# Patient Record
Sex: FEMALE | Race: WHITE | URBAN - METROPOLITAN AREA
[De-identification: names, ages, dates, MRNs, and addresses within clinical notes are randomized per-mention and may not be internally consistent; named-entity substitution may affect disease eponyms.]

---

## 2020-03-03 ENCOUNTER — TELEPHONE (OUTPATIENT)
Dept: PHARMACY | Age: 85
End: 2020-03-03

## 2020-03-03 NOTE — TELEPHONE ENCOUNTER
Patient called to schedule an appt with our [facility], [place] clinic. Needs an appt for 3/12 @ 11am for her transportation. Explained that we needed to get a signed order from their current doctor. Confirmed/Updated patient's address/phone & PCP in Epic. Let the patient know we would contact to schedule as soon as the signed order is received from their PCP.

## 2024-04-14 ENCOUNTER — APPOINTMENT (OUTPATIENT)
Dept: CT IMAGING | Age: 89
End: 2024-04-14
Payer: MEDICARE

## 2024-04-14 ENCOUNTER — HOSPITAL ENCOUNTER (OUTPATIENT)
Age: 89
Setting detail: OBSERVATION
Discharge: HOME | End: 2024-04-17
Attending: EMERGENCY MEDICINE | Admitting: STUDENT IN AN ORGANIZED HEALTH CARE EDUCATION/TRAINING PROGRAM
Payer: MEDICARE

## 2024-04-14 DIAGNOSIS — G89.29 CHRONIC BILATERAL LOW BACK PAIN WITHOUT SCIATICA: ICD-10-CM

## 2024-04-14 DIAGNOSIS — K80.20 GALLSTONES: ICD-10-CM

## 2024-04-14 DIAGNOSIS — R10.9 RIGHT SIDED ABDOMINAL PAIN: Primary | ICD-10-CM

## 2024-04-14 DIAGNOSIS — R53.81 PHYSICAL DECONDITIONING: ICD-10-CM

## 2024-04-14 DIAGNOSIS — M15.9 OSTEOARTHRITIS OF MULTIPLE JOINTS, UNSPECIFIED OSTEOARTHRITIS TYPE: ICD-10-CM

## 2024-04-14 DIAGNOSIS — M54.50 CHRONIC BILATERAL LOW BACK PAIN WITHOUT SCIATICA: ICD-10-CM

## 2024-04-14 DIAGNOSIS — M25.551 RIGHT HIP PAIN: ICD-10-CM

## 2024-04-14 DIAGNOSIS — R53.81 DEBILITY: ICD-10-CM

## 2024-04-14 LAB
ALBUMIN SERPL-MCNC: 4.1 G/DL (ref 3.4–5)
ALBUMIN/GLOB SERPL: 1.3 {RATIO} (ref 1.1–2.2)
ALP SERPL-CCNC: 131 U/L (ref 40–129)
ALT SERPL-CCNC: 8 U/L (ref 10–40)
ANION GAP SERPL CALCULATED.3IONS-SCNC: 11 MMOL/L (ref 3–16)
AST SERPL-CCNC: 14 U/L (ref 15–37)
BASOPHILS # BLD: 0.1 K/UL (ref 0–0.2)
BASOPHILS NFR BLD: 0.9 %
BILIRUB SERPL-MCNC: 0.4 MG/DL (ref 0–1)
BUN SERPL-MCNC: 16 MG/DL (ref 7–20)
CALCIUM SERPL-MCNC: 9.5 MG/DL (ref 8.3–10.6)
CHLORIDE SERPL-SCNC: 99 MMOL/L (ref 99–110)
CO2 SERPL-SCNC: 24 MMOL/L (ref 21–32)
CREAT SERPL-MCNC: 0.7 MG/DL (ref 0.6–1.2)
DEPRECATED RDW RBC AUTO: 15.2 % (ref 12.4–15.4)
EOSINOPHIL # BLD: 0.5 K/UL (ref 0–0.6)
EOSINOPHIL NFR BLD: 5.3 %
GFR SERPLBLD CREATININE-BSD FMLA CKD-EPI: 82 ML/MIN/{1.73_M2}
GLUCOSE SERPL-MCNC: 147 MG/DL (ref 70–99)
HCT VFR BLD AUTO: 39.3 % (ref 36–48)
HGB BLD-MCNC: 13.4 G/DL (ref 12–16)
INR PPP: 1.26 (ref 0.85–1.15)
LACTATE BLDV-SCNC: 1 MMOL/L (ref 0.4–1.9)
LIPASE SERPL-CCNC: 32 U/L (ref 13–60)
LYMPHOCYTES # BLD: 1.7 K/UL (ref 1–5.1)
LYMPHOCYTES NFR BLD: 17.7 %
MCH RBC QN AUTO: 29.5 PG (ref 26–34)
MCHC RBC AUTO-ENTMCNC: 34.1 G/DL (ref 31–36)
MCV RBC AUTO: 86.7 FL (ref 80–100)
MONOCYTES # BLD: 0.9 K/UL (ref 0–1.3)
MONOCYTES NFR BLD: 9.4 %
NEUTROPHILS # BLD: 6.5 K/UL (ref 1.7–7.7)
NEUTROPHILS NFR BLD: 66.7 %
PLATELET # BLD AUTO: 307 K/UL (ref 135–450)
PMV BLD AUTO: 7.7 FL (ref 5–10.5)
POTASSIUM SERPL-SCNC: 4.4 MMOL/L (ref 3.5–5.1)
PROT SERPL-MCNC: 7.3 G/DL (ref 6.4–8.2)
PROTHROMBIN TIME: 15.9 SEC (ref 11.9–14.9)
RBC # BLD AUTO: 4.53 M/UL (ref 4–5.2)
SODIUM SERPL-SCNC: 134 MMOL/L (ref 136–145)
WBC # BLD AUTO: 9.7 K/UL (ref 4–11)

## 2024-04-14 PROCEDURE — 74177 CT ABD & PELVIS W/CONTRAST: CPT

## 2024-04-14 PROCEDURE — 6360000004 HC RX CONTRAST MEDICATION: Performed by: NURSE PRACTITIONER

## 2024-04-14 PROCEDURE — 83690 ASSAY OF LIPASE: CPT

## 2024-04-14 PROCEDURE — 85025 COMPLETE CBC W/AUTO DIFF WBC: CPT

## 2024-04-14 PROCEDURE — 73700 CT LOWER EXTREMITY W/O DYE: CPT

## 2024-04-14 PROCEDURE — 99285 EMERGENCY DEPT VISIT HI MDM: CPT

## 2024-04-14 PROCEDURE — 83605 ASSAY OF LACTIC ACID: CPT

## 2024-04-14 PROCEDURE — 85610 PROTHROMBIN TIME: CPT

## 2024-04-14 PROCEDURE — 80053 COMPREHEN METABOLIC PANEL: CPT

## 2024-04-14 RX ORDER — LOSARTAN POTASSIUM 100 MG/1
50 TABLET ORAL DAILY
Status: ON HOLD | COMMUNITY
End: 2024-04-16 | Stop reason: HOSPADM

## 2024-04-14 RX ORDER — FUROSEMIDE 20 MG/1
20 TABLET ORAL DAILY
Status: ON HOLD | COMMUNITY
End: 2024-04-16 | Stop reason: HOSPADM

## 2024-04-14 RX ORDER — NITROGLYCERIN 0.4 MG/1
0.4 TABLET SUBLINGUAL EVERY 5 MIN PRN
COMMUNITY
Start: 2017-08-01 | End: 2024-06-07

## 2024-04-14 RX ORDER — DOXYCYCLINE HYCLATE 100 MG/1
100 CAPSULE ORAL DAILY
Status: ON HOLD | COMMUNITY
End: 2024-04-16 | Stop reason: HOSPADM

## 2024-04-14 RX ORDER — ATORVASTATIN CALCIUM 20 MG/1
1 TABLET, FILM COATED ORAL NIGHTLY
COMMUNITY
Start: 2024-03-22

## 2024-04-14 RX ORDER — AMOXICILLIN 250 MG
2 CAPSULE ORAL 2 TIMES DAILY
COMMUNITY
Start: 2024-04-12 | End: 2024-05-12

## 2024-04-14 RX ORDER — SUCRALFATE 1 G/1
1 TABLET ORAL 4 TIMES DAILY
COMMUNITY
Start: 2024-04-12 | End: 2024-05-12

## 2024-04-14 RX ORDER — OXYBUTYNIN CHLORIDE 10 MG/1
10 TABLET, EXTENDED RELEASE ORAL DAILY
Status: ON HOLD | COMMUNITY
End: 2024-04-16 | Stop reason: HOSPADM

## 2024-04-14 RX ORDER — TIZANIDINE 2 MG/1
2 TABLET ORAL EVERY 6 HOURS PRN
Status: ON HOLD | COMMUNITY
Start: 2024-03-25 | End: 2024-04-16 | Stop reason: HOSPADM

## 2024-04-14 RX ORDER — FAMOTIDINE 20 MG/1
10 TABLET, FILM COATED ORAL 2 TIMES DAILY
COMMUNITY

## 2024-04-14 RX ORDER — PANTOPRAZOLE SODIUM 40 MG/1
40 TABLET, DELAYED RELEASE ORAL DAILY
COMMUNITY
Start: 2024-04-12

## 2024-04-14 RX ORDER — HYDROCODONE BITARTRATE AND ACETAMINOPHEN 5; 325 MG/1; MG/1
1 TABLET ORAL EVERY 6 HOURS PRN
Status: ON HOLD | COMMUNITY
End: 2024-04-16

## 2024-04-14 RX ORDER — METHOCARBAMOL 500 MG/1
500 TABLET, FILM COATED ORAL 3 TIMES DAILY
COMMUNITY
Start: 2024-04-12 | End: 2024-04-19

## 2024-04-14 RX ORDER — ASPIRIN 81 MG/1
81 TABLET ORAL DAILY
COMMUNITY

## 2024-04-14 RX ORDER — WARFARIN SODIUM 2.5 MG/1
2.5 TABLET ORAL DAILY
Status: ON HOLD | COMMUNITY
Start: 2024-04-12 | End: 2024-04-16 | Stop reason: HOSPADM

## 2024-04-14 RX ORDER — CARVEDILOL 12.5 MG/1
1 TABLET ORAL 2 TIMES DAILY
COMMUNITY
Start: 2024-03-22

## 2024-04-14 RX ADMIN — IOPAMIDOL 75 ML: 755 INJECTION, SOLUTION INTRAVENOUS at 23:53

## 2024-04-15 PROBLEM — M54.50 LOWER BACK PAIN: Status: ACTIVE | Noted: 2024-04-15

## 2024-04-15 PROBLEM — R53.1 GENERALIZED WEAKNESS: Status: ACTIVE | Noted: 2024-04-15

## 2024-04-15 LAB
ANION GAP SERPL CALCULATED.3IONS-SCNC: 10 MMOL/L (ref 3–16)
BACTERIA URNS QL MICRO: ABNORMAL /HPF
BILIRUB UR QL STRIP.AUTO: NEGATIVE
BUN SERPL-MCNC: 14 MG/DL (ref 7–20)
CALCIUM SERPL-MCNC: 9 MG/DL (ref 8.3–10.6)
CHLORIDE SERPL-SCNC: 100 MMOL/L (ref 99–110)
CLARITY UR: CLEAR
CO2 SERPL-SCNC: 26 MMOL/L (ref 21–32)
COLOR UR: YELLOW
CREAT SERPL-MCNC: 0.6 MG/DL (ref 0.6–1.2)
EPI CELLS #/AREA URNS AUTO: 3 /HPF (ref 0–5)
GFR SERPLBLD CREATININE-BSD FMLA CKD-EPI: 85 ML/MIN/{1.73_M2}
GLUCOSE SERPL-MCNC: 96 MG/DL (ref 70–99)
GLUCOSE UR STRIP.AUTO-MCNC: NEGATIVE MG/DL
HGB UR QL STRIP.AUTO: NEGATIVE
HYALINE CASTS #/AREA URNS AUTO: 0 /LPF (ref 0–8)
INR PPP: 1.28 (ref 0.85–1.15)
KETONES UR STRIP.AUTO-MCNC: NEGATIVE MG/DL
LEUKOCYTE ESTERASE UR QL STRIP.AUTO: ABNORMAL
NITRITE UR QL STRIP.AUTO: NEGATIVE
PH UR STRIP.AUTO: 6.5 [PH] (ref 5–8)
POTASSIUM SERPL-SCNC: 4.1 MMOL/L (ref 3.5–5.1)
PROT UR STRIP.AUTO-MCNC: NEGATIVE MG/DL
PROTHROMBIN TIME: 16.2 SEC (ref 11.9–14.9)
RBC CLUMPS #/AREA URNS AUTO: 1 /HPF (ref 0–4)
SODIUM SERPL-SCNC: 136 MMOL/L (ref 136–145)
SP GR UR STRIP.AUTO: >=1.03 (ref 1–1.03)
UA COMPLETE W REFLEX CULTURE PNL UR: ABNORMAL
UA DIPSTICK W REFLEX MICRO PNL UR: YES
URN SPEC COLLECT METH UR: ABNORMAL
UROBILINOGEN UR STRIP-ACNC: 0.2 E.U./DL
WBC #/AREA URNS AUTO: 0 /HPF (ref 0–5)

## 2024-04-15 PROCEDURE — 6370000000 HC RX 637 (ALT 250 FOR IP): Performed by: STUDENT IN AN ORGANIZED HEALTH CARE EDUCATION/TRAINING PROGRAM

## 2024-04-15 PROCEDURE — G0378 HOSPITAL OBSERVATION PER HR: HCPCS

## 2024-04-15 PROCEDURE — 81001 URINALYSIS AUTO W/SCOPE: CPT

## 2024-04-15 PROCEDURE — 96372 THER/PROPH/DIAG INJ SC/IM: CPT

## 2024-04-15 PROCEDURE — 80048 BASIC METABOLIC PNL TOTAL CA: CPT

## 2024-04-15 PROCEDURE — 6360000002 HC RX W HCPCS: Performed by: EMERGENCY MEDICINE

## 2024-04-15 PROCEDURE — 6370000000 HC RX 637 (ALT 250 FOR IP): Performed by: NURSE PRACTITIONER

## 2024-04-15 PROCEDURE — 97530 THERAPEUTIC ACTIVITIES: CPT

## 2024-04-15 PROCEDURE — 85610 PROTHROMBIN TIME: CPT

## 2024-04-15 PROCEDURE — 6370000000 HC RX 637 (ALT 250 FOR IP): Performed by: INTERNAL MEDICINE

## 2024-04-15 PROCEDURE — 97165 OT EVAL LOW COMPLEX 30 MIN: CPT

## 2024-04-15 PROCEDURE — 96374 THER/PROPH/DIAG INJ IV PUSH: CPT

## 2024-04-15 PROCEDURE — 96375 TX/PRO/DX INJ NEW DRUG ADDON: CPT

## 2024-04-15 PROCEDURE — 6370000000 HC RX 637 (ALT 250 FOR IP): Performed by: EMERGENCY MEDICINE

## 2024-04-15 PROCEDURE — 6360000002 HC RX W HCPCS: Performed by: NURSE PRACTITIONER

## 2024-04-15 PROCEDURE — 2580000003 HC RX 258: Performed by: STUDENT IN AN ORGANIZED HEALTH CARE EDUCATION/TRAINING PROGRAM

## 2024-04-15 PROCEDURE — 97535 SELF CARE MNGMENT TRAINING: CPT

## 2024-04-15 PROCEDURE — 97116 GAIT TRAINING THERAPY: CPT

## 2024-04-15 PROCEDURE — 96376 TX/PRO/DX INJ SAME DRUG ADON: CPT

## 2024-04-15 PROCEDURE — 97162 PT EVAL MOD COMPLEX 30 MIN: CPT

## 2024-04-15 PROCEDURE — 36415 COLL VENOUS BLD VENIPUNCTURE: CPT

## 2024-04-15 RX ORDER — ACETAMINOPHEN 325 MG/1
650 TABLET ORAL EVERY 6 HOURS PRN
Status: DISCONTINUED | OUTPATIENT
Start: 2024-04-15 | End: 2024-04-17 | Stop reason: HOSPADM

## 2024-04-15 RX ORDER — ONDANSETRON 2 MG/ML
4 INJECTION INTRAMUSCULAR; INTRAVENOUS EVERY 6 HOURS PRN
Status: DISCONTINUED | OUTPATIENT
Start: 2024-04-15 | End: 2024-04-15 | Stop reason: ALTCHOICE

## 2024-04-15 RX ORDER — ATORVASTATIN CALCIUM 20 MG/1
20 TABLET, FILM COATED ORAL NIGHTLY
Status: DISCONTINUED | OUTPATIENT
Start: 2024-04-15 | End: 2024-04-17 | Stop reason: HOSPADM

## 2024-04-15 RX ORDER — LIDOCAINE 4 G/G
2 PATCH TOPICAL DAILY
Status: DISCONTINUED | OUTPATIENT
Start: 2024-04-15 | End: 2024-04-17

## 2024-04-15 RX ORDER — LIDOCAINE 4 G/G
2 PATCH TOPICAL ONCE
Status: COMPLETED | OUTPATIENT
Start: 2024-04-15 | End: 2024-04-15

## 2024-04-15 RX ORDER — POTASSIUM CHLORIDE 7.45 MG/ML
10 INJECTION INTRAVENOUS PRN
Status: DISCONTINUED | OUTPATIENT
Start: 2024-04-15 | End: 2024-04-17 | Stop reason: HOSPADM

## 2024-04-15 RX ORDER — PANTOPRAZOLE SODIUM 40 MG/1
40 TABLET, DELAYED RELEASE ORAL DAILY
Status: DISCONTINUED | OUTPATIENT
Start: 2024-04-15 | End: 2024-04-17 | Stop reason: HOSPADM

## 2024-04-15 RX ORDER — POLYETHYLENE GLYCOL 3350 17 G/17G
17 POWDER, FOR SOLUTION ORAL DAILY PRN
Status: DISCONTINUED | OUTPATIENT
Start: 2024-04-15 | End: 2024-04-16

## 2024-04-15 RX ORDER — SODIUM CHLORIDE 0.9 % (FLUSH) 0.9 %
5-40 SYRINGE (ML) INJECTION EVERY 12 HOURS SCHEDULED
Status: DISCONTINUED | OUTPATIENT
Start: 2024-04-15 | End: 2024-04-17 | Stop reason: HOSPADM

## 2024-04-15 RX ORDER — POTASSIUM CHLORIDE 20 MEQ/1
40 TABLET, EXTENDED RELEASE ORAL PRN
Status: DISCONTINUED | OUTPATIENT
Start: 2024-04-15 | End: 2024-04-17 | Stop reason: HOSPADM

## 2024-04-15 RX ORDER — ENOXAPARIN SODIUM 100 MG/ML
30 INJECTION SUBCUTANEOUS DAILY
Status: DISCONTINUED | OUTPATIENT
Start: 2024-04-15 | End: 2024-04-16

## 2024-04-15 RX ORDER — MAGNESIUM SULFATE IN WATER 40 MG/ML
2000 INJECTION, SOLUTION INTRAVENOUS PRN
Status: DISCONTINUED | OUTPATIENT
Start: 2024-04-15 | End: 2024-04-17 | Stop reason: HOSPADM

## 2024-04-15 RX ORDER — PROCHLORPERAZINE EDISYLATE 5 MG/ML
5 INJECTION INTRAMUSCULAR; INTRAVENOUS EVERY 8 HOURS PRN
Status: DISCONTINUED | OUTPATIENT
Start: 2024-04-15 | End: 2024-04-15 | Stop reason: HOSPADM

## 2024-04-15 RX ORDER — SENNA AND DOCUSATE SODIUM 50; 8.6 MG/1; MG/1
2 TABLET, FILM COATED ORAL 2 TIMES DAILY
Status: DISCONTINUED | OUTPATIENT
Start: 2024-04-15 | End: 2024-04-17 | Stop reason: HOSPADM

## 2024-04-15 RX ORDER — HYDROMORPHONE HYDROCHLORIDE 1 MG/ML
0.5 INJECTION, SOLUTION INTRAMUSCULAR; INTRAVENOUS; SUBCUTANEOUS
Status: COMPLETED | OUTPATIENT
Start: 2024-04-15 | End: 2024-04-15

## 2024-04-15 RX ORDER — ENOXAPARIN SODIUM 100 MG/ML
40 INJECTION SUBCUTANEOUS DAILY
Status: DISCONTINUED | OUTPATIENT
Start: 2024-04-15 | End: 2024-04-15 | Stop reason: ALTCHOICE

## 2024-04-15 RX ORDER — HYDROCODONE BITARTRATE AND ACETAMINOPHEN 5; 325 MG/1; MG/1
1 TABLET ORAL EVERY 6 HOURS PRN
Status: DISCONTINUED | OUTPATIENT
Start: 2024-04-15 | End: 2024-04-17 | Stop reason: HOSPADM

## 2024-04-15 RX ORDER — ONDANSETRON 4 MG/1
4 TABLET, ORALLY DISINTEGRATING ORAL EVERY 8 HOURS PRN
Status: DISCONTINUED | OUTPATIENT
Start: 2024-04-15 | End: 2024-04-17 | Stop reason: HOSPADM

## 2024-04-15 RX ORDER — ACETAMINOPHEN 650 MG/1
650 SUPPOSITORY RECTAL EVERY 6 HOURS PRN
Status: DISCONTINUED | OUTPATIENT
Start: 2024-04-15 | End: 2024-04-17 | Stop reason: HOSPADM

## 2024-04-15 RX ORDER — CEFUROXIME AXETIL 250 MG/1
500 TABLET ORAL EVERY 12 HOURS SCHEDULED
Status: DISCONTINUED | OUTPATIENT
Start: 2024-04-15 | End: 2024-04-17 | Stop reason: HOSPADM

## 2024-04-15 RX ORDER — SODIUM CHLORIDE 9 MG/ML
INJECTION, SOLUTION INTRAVENOUS PRN
Status: DISCONTINUED | OUTPATIENT
Start: 2024-04-15 | End: 2024-04-17 | Stop reason: HOSPADM

## 2024-04-15 RX ORDER — FAMOTIDINE 20 MG/1
10 TABLET, FILM COATED ORAL 2 TIMES DAILY
Status: DISCONTINUED | OUTPATIENT
Start: 2024-04-15 | End: 2024-04-15

## 2024-04-15 RX ORDER — ONDANSETRON 2 MG/ML
4 INJECTION INTRAMUSCULAR; INTRAVENOUS EVERY 6 HOURS PRN
Status: DISCONTINUED | OUTPATIENT
Start: 2024-04-15 | End: 2024-04-17 | Stop reason: HOSPADM

## 2024-04-15 RX ORDER — SODIUM CHLORIDE 0.9 % (FLUSH) 0.9 %
5-40 SYRINGE (ML) INJECTION PRN
Status: DISCONTINUED | OUTPATIENT
Start: 2024-04-15 | End: 2024-04-17 | Stop reason: HOSPADM

## 2024-04-15 RX ORDER — ASPIRIN 81 MG/1
81 TABLET ORAL DAILY
Status: DISCONTINUED | OUTPATIENT
Start: 2024-04-15 | End: 2024-04-17 | Stop reason: HOSPADM

## 2024-04-15 RX ORDER — CARVEDILOL 6.25 MG/1
12.5 TABLET ORAL 2 TIMES DAILY
Status: DISCONTINUED | OUTPATIENT
Start: 2024-04-15 | End: 2024-04-17 | Stop reason: HOSPADM

## 2024-04-15 RX ORDER — OXYBUTYNIN CHLORIDE 10 MG/1
10 TABLET, EXTENDED RELEASE ORAL DAILY
Status: DISCONTINUED | OUTPATIENT
Start: 2024-04-15 | End: 2024-04-15

## 2024-04-15 RX ORDER — LOSARTAN POTASSIUM 100 MG/1
50 TABLET ORAL DAILY
Status: DISCONTINUED | OUTPATIENT
Start: 2024-04-15 | End: 2024-04-15

## 2024-04-15 RX ORDER — MORPHINE SULFATE 2 MG/ML
2 INJECTION, SOLUTION INTRAMUSCULAR; INTRAVENOUS EVERY 4 HOURS PRN
Status: DISCONTINUED | OUTPATIENT
Start: 2024-04-15 | End: 2024-04-17 | Stop reason: HOSPADM

## 2024-04-15 RX ORDER — WARFARIN SODIUM 5 MG/1
5 TABLET ORAL DAILY
Status: COMPLETED | OUTPATIENT
Start: 2024-04-15 | End: 2024-04-16

## 2024-04-15 RX ORDER — CALCIUM CARBONATE 500 MG/1
500 TABLET, CHEWABLE ORAL 3 TIMES DAILY PRN
Status: DISCONTINUED | OUTPATIENT
Start: 2024-04-15 | End: 2024-04-17 | Stop reason: HOSPADM

## 2024-04-15 RX ORDER — HYDROCODONE BITARTRATE AND ACETAMINOPHEN 5; 325 MG/1; MG/1
1 TABLET ORAL ONCE
Status: COMPLETED | OUTPATIENT
Start: 2024-04-15 | End: 2024-04-15

## 2024-04-15 RX ORDER — KETOROLAC TROMETHAMINE 15 MG/ML
15 INJECTION, SOLUTION INTRAMUSCULAR; INTRAVENOUS EVERY 8 HOURS
Status: DISPENSED | OUTPATIENT
Start: 2024-04-15 | End: 2024-04-17

## 2024-04-15 RX ORDER — METHOCARBAMOL 500 MG/1
500 TABLET, FILM COATED ORAL 3 TIMES DAILY
Status: DISCONTINUED | OUTPATIENT
Start: 2024-04-15 | End: 2024-04-17 | Stop reason: HOSPADM

## 2024-04-15 RX ADMIN — ONDANSETRON 4 MG: 2 INJECTION INTRAMUSCULAR; INTRAVENOUS at 01:43

## 2024-04-15 RX ADMIN — METHOCARBAMOL 500 MG: 500 TABLET ORAL at 21:02

## 2024-04-15 RX ADMIN — ONDANSETRON 4 MG: 4 TABLET, ORALLY DISINTEGRATING ORAL at 11:54

## 2024-04-15 RX ADMIN — SENNOSIDES AND DOCUSATE SODIUM 2 TABLET: 8.6; 5 TABLET ORAL at 11:51

## 2024-04-15 RX ADMIN — HYDROMORPHONE HYDROCHLORIDE 0.5 MG: 1 INJECTION, SOLUTION INTRAMUSCULAR; INTRAVENOUS; SUBCUTANEOUS at 01:43

## 2024-04-15 RX ADMIN — ACETAMINOPHEN 650 MG: 325 TABLET ORAL at 06:37

## 2024-04-15 RX ADMIN — CARVEDILOL 12.5 MG: 6.25 TABLET, FILM COATED ORAL at 08:33

## 2024-04-15 RX ADMIN — SENNOSIDES AND DOCUSATE SODIUM 2 TABLET: 8.6; 5 TABLET ORAL at 21:03

## 2024-04-15 RX ADMIN — CEFUROXIME AXETIL 500 MG: 250 TABLET ORAL at 11:51

## 2024-04-15 RX ADMIN — KETOROLAC TROMETHAMINE 15 MG: 15 INJECTION, SOLUTION INTRAMUSCULAR; INTRAVENOUS at 18:26

## 2024-04-15 RX ADMIN — ENOXAPARIN SODIUM 30 MG: 100 INJECTION SUBCUTANEOUS at 16:52

## 2024-04-15 RX ADMIN — PANTOPRAZOLE SODIUM 40 MG: 40 TABLET, DELAYED RELEASE ORAL at 08:33

## 2024-04-15 RX ADMIN — METHOCARBAMOL 500 MG: 500 TABLET ORAL at 13:41

## 2024-04-15 RX ADMIN — KETOROLAC TROMETHAMINE 15 MG: 15 INJECTION, SOLUTION INTRAMUSCULAR; INTRAVENOUS at 11:51

## 2024-04-15 RX ADMIN — ATORVASTATIN CALCIUM 20 MG: 20 TABLET, FILM COATED ORAL at 21:02

## 2024-04-15 RX ADMIN — HYDROCODONE BITARTRATE AND ACETAMINOPHEN 1 TABLET: 5; 325 TABLET ORAL at 09:24

## 2024-04-15 RX ADMIN — SODIUM CHLORIDE, PRESERVATIVE FREE 10 ML: 5 INJECTION INTRAVENOUS at 08:33

## 2024-04-15 RX ADMIN — ASPIRIN 81 MG: 81 TABLET, COATED ORAL at 08:33

## 2024-04-15 RX ADMIN — ANTACID TABLETS 500 MG: 500 TABLET, CHEWABLE ORAL at 16:52

## 2024-04-15 RX ADMIN — HYDROCODONE BITARTRATE AND ACETAMINOPHEN 1 TABLET: 5; 325 TABLET ORAL at 21:02

## 2024-04-15 RX ADMIN — HYDROCODONE BITARTRATE AND ACETAMINOPHEN 1 TABLET: 5; 325 TABLET ORAL at 00:32

## 2024-04-15 RX ADMIN — ONDANSETRON 4 MG: 4 TABLET, ORALLY DISINTEGRATING ORAL at 21:08

## 2024-04-15 RX ADMIN — CARVEDILOL 12.5 MG: 6.25 TABLET, FILM COATED ORAL at 21:03

## 2024-04-15 RX ADMIN — WARFARIN SODIUM 5 MG: 5 TABLET ORAL at 16:52

## 2024-04-15 RX ADMIN — SODIUM CHLORIDE, PRESERVATIVE FREE 10 ML: 5 INJECTION INTRAVENOUS at 21:14

## 2024-04-15 RX ADMIN — CEFUROXIME AXETIL 500 MG: 250 TABLET ORAL at 21:02

## 2024-04-15 ASSESSMENT — PAIN SCALES - GENERAL
PAINLEVEL_OUTOF10: 4
PAINLEVEL_OUTOF10: 6
PAINLEVEL_OUTOF10: 5
PAINLEVEL_OUTOF10: 5
PAINLEVEL_OUTOF10: 9
PAINLEVEL_OUTOF10: 6
PAINLEVEL_OUTOF10: 5

## 2024-04-15 ASSESSMENT — PAIN DESCRIPTION - LOCATION
LOCATION: ABDOMEN;BACK
LOCATION: ABDOMEN
LOCATION: ABDOMEN
LOCATION: BACK
LOCATION: ABDOMEN

## 2024-04-15 ASSESSMENT — PAIN DESCRIPTION - PAIN TYPE: TYPE: ACUTE PAIN;CHRONIC PAIN

## 2024-04-15 ASSESSMENT — PAIN - FUNCTIONAL ASSESSMENT
PAIN_FUNCTIONAL_ASSESSMENT: ACTIVITIES ARE NOT PREVENTED
PAIN_FUNCTIONAL_ASSESSMENT: PREVENTS OR INTERFERES SOME ACTIVE ACTIVITIES AND ADLS
PAIN_FUNCTIONAL_ASSESSMENT: ACTIVITIES ARE NOT PREVENTED

## 2024-04-15 ASSESSMENT — PAIN DESCRIPTION - DESCRIPTORS
DESCRIPTORS: CRAMPING;SPASM

## 2024-04-15 ASSESSMENT — ENCOUNTER SYMPTOMS
SHORTNESS OF BREATH: 0
DIARRHEA: 0
NAUSEA: 0
VOMITING: 0
ABDOMINAL PAIN: 1
CHEST TIGHTNESS: 0

## 2024-04-15 ASSESSMENT — PAIN DESCRIPTION - ORIENTATION: ORIENTATION: RIGHT;LEFT;LOWER

## 2024-04-15 NOTE — ED NOTES
ED TO INPATIENT SBAR HANDOFF    Patient Name: Ivania Ruiz   :  3/3/1933  89 y.o.   MRN:  [id number]  Preferred Name  Ivania  ED Room #:  ED-0010/10  Family/Caregiver Present no   Restraints no   Sitter no   Sepsis Risk Score Sepsis Risk Score: 1.68    Situation  Code Status: No Order No additional code details.    Allergies: Atenolol, Dipyridamole, Felodipine, Iodinated contrast media, Metoprolol, Norfloxacin, Other, Paroxetine hcl, Pentazocine, Sulfa antibiotics, Adenosine, Codeine, Morphine, and Oxycodone-acetaminophen  Weight: Patient Vitals for the past 96 hrs (Last 3 readings):   Weight   04/15/24 0137 66.5 kg (146 lb 8 oz)     Arrived from: home  Chief Complaint:   Chief Complaint   Patient presents with    Abdominal Pain     Pt via daughter from home, c/o abdominal pain, admitted 3 times this year for same, a lot of UTI and pyelonephritis issues, and adrenal mass     Hospital Problem/Diagnosis:  Principal Problem:    Generalized weakness  Resolved Problems:    * No resolved hospital problems. *    Imaging:   CT HIP RIGHT WO CONTRAST   Final Result   1. No evidence of acute fracture or dislocation.   2. Moderate degenerative arthrosis of the right hip joint, right sacroiliac   joint and pubic symphysis.   3. Demineralization of the osseous structures suggesting osteoporosis.         CT ABDOMEN PELVIS W IV CONTRAST Additional Contrast? None   Final Result   1. Cholelithiasis, with gallbladder distension.  No pericholecystic   inflammatory stranding seen to suggest definite cholecystitis.  Could   consider further evaluation with ultrasound if clinically indicated.   2. Small hiatal hernia.   3. Aortic valve calcifications are noted which can be associated with aortic   stenosis.   4. Atherosclerosis including coronary artery involvement.   5. Other nonacute incidental findings as above for which no additional   follow-up necessary.           Abnormal labs:   Abnormal Labs Reviewed   COMPREHENSIVE METABOLIC

## 2024-04-15 NOTE — ED PROVIDER NOTES
EMERGENCY DEPARTMENT SUPERVISING PHYSICIAN NOTE    I have seen this patient & have reviewed history and findings with the PA, NP, or resident physician and provided direct personal supervision as needed. I was present for key portions of any procedures performed. I've participated in medical management, monitoring, and treatment of this patient with the provider. I have reviewed currently available documentation, test results, and laboratory results in the interim. Care plan has been developed collaboratively.    Ivania Ruiz is a 89 y.o. year old female presenting to the emergency department for Abdominal Pain (Pt via daughter from home, c/o abdominal pain, admitted 3 times this year for same, a lot of UTI and pyelonephritis issues, and adrenal mass)    Brief HPI:  89yrs F reports that she has been suffering with persistent pain throughout her right abdomen, right hip, and right lower back at least for the past month.  Has had multiple recent hospitalizations at [facility].    Has had incredibly thorough recent evaluation with:  - Lab testing  - CT imaging of her chest, abdomen, pelvis, lumbar spine  - Nuclear medicine whole-body bone scan  - Abdominal ultrasound  - MRI abdomen and lumbar spine with and without contrast  - Surgical and gastroenterology consultations    Was found to have gallstones and numerous areas of bony degenerative changes  Was told that she would not be appropriate for surgical interventions for these issues given her advanced age and general poor state of health    Since returning home she has been suffering with what she describes as very severe pain  She has been unable to walk independently and unable to perform her ADLs well    Pertinent Exam Findings:  Awake, alert, very chronically but not acutely ill-appearing  Resting in exam bed with no overt, external signs of distress  Abdomen soft, NT, ND, +BS    Plan:  She and her family members at bedside are requesting 
Support Exception - unselect if not a suspected or confirmed emergency medical condition->Emergency Medical Condition (MA) FINDINGS: Lower Chest: No distal esophageal thickening.  Small hiatal hernia.  Aortic and coronary artery atherosclerosis.  Aortic valve calcifications are seen which can be associated with aortic stenosis.  No parenchymal consolidation. No pleural effusion is identified. Organs: No hypodense mass identified in the liver or spleen.  Calcified splenic granulomas are seen throughout.  Cholelithiasis.  No pericholecystic inflammatory stranding is identified.  No adrenal mass.  No pancreatic mass or ductal dilation.  No solid enhancing renal mass.  No renal calculi or obstruction.  No ureteral calculi are seen. GI/Bowel: Scattered stool identified throughout the colon.  No ileus or obstruction.  No focal inflammatory bowel wall thickening.  No acute diverticulitis.  No CT finding to suggest appendicitis. Pelvis: No pelvic mass.  No pelvic hemorrhage.  No bladder wall thickening. Uterus appears unremarkable. Peritoneum/Retroperitoneum: No aortic aneurysm.  Aorto iliac and mesenteric artery atherosclerosis.  No bowel herniation is identified. Bones/Soft Tissues: Chronic T12, L3 and L5 compression deformities with cement from prior augmentation.  Subacute to chronic appearing compression fracture at L1.  Chronic appearing compression deformity L2 and minimal compression deformity at L4, chronic in appearance.  No acute osseous abnormality is identified.  Diffuse osteopenia.  No pelvic fracture. Degenerative changes seen throughout the lower thoracic and lumbar spine. Exaggerated kyphosis at T12-L1.     1. Cholelithiasis, with gallbladder distension.  No pericholecystic inflammatory stranding seen to suggest definite cholecystitis.  Could consider further evaluation with ultrasound if clinically indicated. 2. Small hiatal hernia. 3. Aortic valve calcifications are noted which can be associated with

## 2024-04-15 NOTE — ACP (ADVANCE CARE PLANNING)
Advance Care Planning     Palliative Team Advance Care Planning (ACP) Conversation    Date of Conversation: 04/15/24    Key individuals present for the conversation:  Patient with decision making capacity and Legal next of kin    Other individuals present: Patient and Daughter Felipa     ACP documents on file prior to discussion:    [] Advance Directive  [] Portable DNR  [] POLST  [] [place] MOST  [x] None    Healthcare Decision Maker: Daughter Felipa when indicated. [phone]       Conversation Summary: Patient and daughter agree with full code after education provided.     Resuscitation Status:   Code Status: Full Code     Completed Documentation:    [] Advance Directive  [] Portable DNR  [] POLST  [] [place] MOST  [x] No new documents completed    I spent 20 minutes with the patient and/or surrogate decision maker discussing the patient's wishes and goals as detailed in the above note.     Gladys Clancy RN

## 2024-04-15 NOTE — CARE COORDINATION
Case Management Note:    Patient admitted as Observation with an anticipated short hospitalization length of stay. Chart reviewed and it appears that patient will most likely need SNF, there is a pending palliative consult as well. Pt has pending PT/OT orders.       Case management will continue to follow progress and update discharge plan as needed.     Electronically signed by Sofia Washington on 4/15/2024 at 8:34 AM

## 2024-04-15 NOTE — H&P
V2.0  History and Physical      Name:  Ivania Ruiz /Age/Sex: 3/3/1933  (89 y.o. female)   MRN & CSN:  [id number] & [id number] Encounter Date/Time: 4/15/2024 1:47 AM EDT   Location:  [facility] PCP: Carolann Sheets MD       Hospital Day: 2    Assessment and Plan:   Ivania Ruiz is a 89 y.o. female with a pmh of A-fib, COPD, CVA, hypertension, hyperlipidemia who presents with chief complaint of lower back pain.    Hospital Problems             Last Modified POA    * (Principal) Generalized weakness 4/15/2024 Yes    Lower back pain 4/15/2024 Yes       Plan:  # Lower back pain:  -Patient presented with chief complaint of lower back pain.  Reports that the back pain radiates to lower abdomen.  The pain gets worse with ambulation  -CT scan of abdomen and pelvis was done and showed Chronic T12, L3 and L5 compression deformities with  cement from prior augmentation.  Subacute to chronic appearing compression fracture at L1.  Chronic appearing compression deformity L2 and minimal compression deformity at L4. Degenerative changes seen throughout the lower thoracic and lumbar spine.  -Patient had MRI of lumbar spine 2024 and showed Acute or subacute compression deformity of the L1 vertebral body. Multilevel degenerative changes of the lumbar spine which are most significant at L2-3.   -PT OT  -Pain control  -Palliative care consult    # Cholelithiasis?  -CT scan of abdomen and pelvis showed Cholelithiasis, with gallbladder distension.  No pericholecystic inflammatory stranding seen to suggest definite cholecystitis.  -Jain sign negative.  -Bilirubin within normal limit    # History of A-fib:  -Continue home carvedilol 12.5 mg twice daily  -Continue home warfarin    # Hyperlipidemia:  -Continue home Lipitor 20 mg    # History of CVA:  -Continue home aspirin and atorvastatin 20 mg    # GERD:  -Continue home Protonix    # CAD:  -Continue home aspirin, Coreg, atorvastatin      Disposition:   Current Living

## 2024-04-15 NOTE — CONSULTS
Pharmacy to Dose Warfarin    Pharmacy consulted to dose warfarin for Afib.    INR Goal: 2-3    INR today: 1.28    Home dose: 2.5 mg daily except 5 mg Tu/Fri  Of note, patient follows with her cardiologist as outpatient. Recent subtherapeutic INRs and has not been bridged.    Assessment/Plan:  - INR subtherapeutic  - warfarin 5 mg tonight  - Possible concomitant drug-drug interactions include: aspirin     Pharmacy will continue to follow.    Sheryl Pichardo, PharmD, BCPS  [id number]  4/15/2024 8:02 AM

## 2024-04-15 NOTE — CARE COORDINATION
Discharge Planning Note Re: Home Health Care     CM/SW noted consult for discharge planning. Chart reviewed. Noted recommendations for home health care. CM placed a call to Pt's daughter about HHC recommendations for 24 hour support.     CM called Felipa Dtr. [phone]    Electronically signed by Sofia Washington on 4/15/2024 at 2:32 PM     Late Entry for 4/15 - CM to bedside to discuss HHC with Pt. Dtr. On telephone. CM educated PT and Dtr. That hospice usually does not cover PT/OT, but some hospice companies will assist with a few sessions. Pt. And Dtr. Want to continue with plan of for hospice, and not HHC.     Electronically signed by Sofia Washington on 4/16/2024 at 8:24 AM

## 2024-04-15 NOTE — CARE COORDINATION
Case Management Assessment  Initial Evaluation    Date/Time of Evaluation: 4/15/2024 9:12 AM  Assessment Completed by: Sofia Washington    If patient is discharged prior to next notation, then this note serves as note for discharge by case management.    Patient Name: Ivania Ruiz                   YOB: 1933  Diagnosis: Debility [R53.81]  Lower back pain [M54.50]  Gallstones [K80.20]  Generalized weakness [R53.1]  Physical deconditioning [R53.81]  Right hip pain [M25.551]  Right sided abdominal pain [R10.9]  Osteoarthritis of multiple joints, unspecified osteoarthritis type [M15.9]                   Date / Time: 4/14/2024  9:50 PM    Patient Admission Status: Observation   Readmission Risk (Low < 19, Mod (19-27), High > 27): No data recorded  Current PCP: Carolann Sheets MD  PCP verified by CM? (P) Yes    Chart Reviewed: Yes      History Provided by: (P) Patient  Patient Orientation: (P) Alert and Oriented, Person, Place    Patient Cognition: (P) Alert    Hospitalization in the last 30 days (Readmission):  No    If yes, Readmission Assessment in  Navigator will be completed.    Advance Directives:      Code Status: Full Code   Patient's Primary Decision Maker is: (P) Legal Next of Kin      Discharge Planning:    Patient lives with:   Type of Home: (P) Other (Comment) (PT/OT Pending)  Primary Care Giver: (P) Family  Patient Support Systems include: (P) Children   Current Financial resources: (P) Medicare  Current community resources: (P) None  Current services prior to admission: (P) Durable Medical Equipment            Current DME: (P) Walker, Other (Comment), Shower Chair (walker, rollator, toielt seat riser)            Type of Home Care services:  (P) None    ADLS  Prior functional level: (P) Assistance with the following:, Housework, Shopping, Mobility (dimple [employer] helps with grocery shopping. Alexis gets prescriptions . [facility] helps)  Current functional level: (P) Assistance with the

## 2024-04-15 NOTE — CONSULTS
Palliative Care:    a 89 y.o. female with pmh of A-fib, COPD, CVA, hypertension, hyperlipidemia who presents with lower back pain.     Patient mentioned that her lower back pain has been going on since February.  Reports that her lower back pain radiates to her lower abdomen.  Patient also endorses right hip pain.  Denies having chest pain, shortness of breath, nausea, vomiting.  Reports that her lower back pain and lower abdominal pain gets worse with ambulation.  Denies having constipation, diarrhea, lower extremity edema.  Patient denies having urinary urgency/frequency, dysuria.  Patient has been hospitalized 3 times since February for lower back pain and lower abdominal pain @ [facility]. Patient is currently interested in palliative care consult.  Patient admitted 4/15/24 and Palliative consult placed.     Past Medical History:   has no past medical history on file.    Past Surgical History:   has no past surgical history on file.    Advance Directives:        Advance Care Planning     Palliative Team Advance Care Planning (ACP) Conversation    Date of Conversation: 04/15/24    Key individuals present for the conversation:  Patient with decision making capacity and Legal next of kin    Other individuals present: Legal healthcare decision maker as named below and Patient     ACP documents on file prior to discussion:    [] Advance Directive  [] Portable DNR  [] POLST  [] [place] MOST  [x] None    Healthcare Decision Maker: Felipa Mitchell daughter: [phone]       Conversation Summary: Patient agrees with Full Code status. Daughter Felipa on phone during these conversations.     Resuscitation Status: Full Code    Completed Documentation:    [] Advance Directive  [] Portable DNR  [] POLST  [] [place] MOST  [x] No new documents completed    I spent 20 minutes with the patient and/or surrogate decision maker discussing the patient's wishes and goals as detailed in the above note.     Gladys Clancy,

## 2024-04-16 ENCOUNTER — APPOINTMENT (OUTPATIENT)
Dept: GENERAL RADIOLOGY | Age: 89
End: 2024-04-16
Payer: MEDICARE

## 2024-04-16 LAB
ANION GAP SERPL CALCULATED.3IONS-SCNC: 9 MMOL/L (ref 3–16)
BASOPHILS # BLD: 0.1 K/UL (ref 0–0.2)
BASOPHILS NFR BLD: 0.8 %
BUN SERPL-MCNC: 16 MG/DL (ref 7–20)
CALCIUM SERPL-MCNC: 8.9 MG/DL (ref 8.3–10.6)
CHLORIDE SERPL-SCNC: 104 MMOL/L (ref 99–110)
CO2 SERPL-SCNC: 25 MMOL/L (ref 21–32)
CREAT SERPL-MCNC: 1 MG/DL (ref 0.6–1.2)
DEPRECATED RDW RBC AUTO: 15.4 % (ref 12.4–15.4)
EOSINOPHIL # BLD: 0.7 K/UL (ref 0–0.6)
EOSINOPHIL NFR BLD: 8 %
GFR SERPLBLD CREATININE-BSD FMLA CKD-EPI: 53 ML/MIN/{1.73_M2}
GLUCOSE SERPL-MCNC: 97 MG/DL (ref 70–99)
HCT VFR BLD AUTO: 34.9 % (ref 36–48)
HGB BLD-MCNC: 11.8 G/DL (ref 12–16)
INR PPP: 1.59 (ref 0.85–1.15)
LYMPHOCYTES # BLD: 1.6 K/UL (ref 1–5.1)
LYMPHOCYTES NFR BLD: 18.9 %
MCH RBC QN AUTO: 29.5 PG (ref 26–34)
MCHC RBC AUTO-ENTMCNC: 33.7 G/DL (ref 31–36)
MCV RBC AUTO: 87.5 FL (ref 80–100)
MONOCYTES # BLD: 0.9 K/UL (ref 0–1.3)
MONOCYTES NFR BLD: 11.2 %
NEUTROPHILS # BLD: 5 K/UL (ref 1.7–7.7)
NEUTROPHILS NFR BLD: 61.1 %
PLATELET # BLD AUTO: 219 K/UL (ref 135–450)
PMV BLD AUTO: 7.7 FL (ref 5–10.5)
POTASSIUM SERPL-SCNC: 4.9 MMOL/L (ref 3.5–5.1)
PROTHROMBIN TIME: 19.1 SEC (ref 11.9–14.9)
RBC # BLD AUTO: 3.99 M/UL (ref 4–5.2)
SODIUM SERPL-SCNC: 138 MMOL/L (ref 136–145)
WBC # BLD AUTO: 8.2 K/UL (ref 4–11)

## 2024-04-16 PROCEDURE — 85025 COMPLETE CBC W/AUTO DIFF WBC: CPT

## 2024-04-16 PROCEDURE — G0378 HOSPITAL OBSERVATION PER HR: HCPCS

## 2024-04-16 PROCEDURE — 74018 RADEX ABDOMEN 1 VIEW: CPT

## 2024-04-16 PROCEDURE — 6370000000 HC RX 637 (ALT 250 FOR IP): Performed by: INTERNAL MEDICINE

## 2024-04-16 PROCEDURE — 96376 TX/PRO/DX INJ SAME DRUG ADON: CPT

## 2024-04-16 PROCEDURE — 6370000000 HC RX 637 (ALT 250 FOR IP): Performed by: STUDENT IN AN ORGANIZED HEALTH CARE EDUCATION/TRAINING PROGRAM

## 2024-04-16 PROCEDURE — 96372 THER/PROPH/DIAG INJ SC/IM: CPT

## 2024-04-16 PROCEDURE — 6360000002 HC RX W HCPCS: Performed by: NURSE PRACTITIONER

## 2024-04-16 PROCEDURE — 85610 PROTHROMBIN TIME: CPT

## 2024-04-16 PROCEDURE — 80048 BASIC METABOLIC PNL TOTAL CA: CPT

## 2024-04-16 PROCEDURE — 36415 COLL VENOUS BLD VENIPUNCTURE: CPT

## 2024-04-16 PROCEDURE — 2580000003 HC RX 258: Performed by: STUDENT IN AN ORGANIZED HEALTH CARE EDUCATION/TRAINING PROGRAM

## 2024-04-16 PROCEDURE — 6370000000 HC RX 637 (ALT 250 FOR IP): Performed by: NURSE PRACTITIONER

## 2024-04-16 RX ORDER — CEFUROXIME AXETIL 500 MG/1
500 TABLET ORAL EVERY 12 HOURS SCHEDULED
Qty: 3 TABLET | Refills: 0 | COMMUNITY
Start: 2024-04-16

## 2024-04-16 RX ORDER — AMLODIPINE BESYLATE 5 MG/1
5 TABLET ORAL DAILY
COMMUNITY
Start: 2024-04-16

## 2024-04-16 RX ORDER — WARFARIN SODIUM 5 MG/1
TABLET ORAL
Qty: 30 TABLET | Refills: 3 | COMMUNITY
Start: 2024-04-16

## 2024-04-16 RX ORDER — POLYETHYLENE GLYCOL 3350 17 G/17G
17 POWDER, FOR SOLUTION ORAL DAILY
Qty: 527 G | Refills: 1 | Status: SHIPPED | OUTPATIENT
Start: 2024-04-16

## 2024-04-16 RX ORDER — BISACODYL 10 MG
10 SUPPOSITORY, RECTAL RECTAL ONCE
Status: COMPLETED | OUTPATIENT
Start: 2024-04-16 | End: 2024-04-16

## 2024-04-16 RX ORDER — POLYETHYLENE GLYCOL 3350 17 G/17G
17 POWDER, FOR SOLUTION ORAL DAILY
Status: DISCONTINUED | OUTPATIENT
Start: 2024-04-16 | End: 2024-04-17 | Stop reason: HOSPADM

## 2024-04-16 RX ORDER — HYDROCODONE BITARTRATE AND ACETAMINOPHEN 5; 325 MG/1; MG/1
1 TABLET ORAL EVERY 6 HOURS PRN
Qty: 12 TABLET | Refills: 0 | Status: SHIPPED | OUTPATIENT
Start: 2024-04-16 | End: 2024-04-19

## 2024-04-16 RX ORDER — BISACODYL 5 MG/1
5 TABLET, DELAYED RELEASE ORAL ONCE
Status: COMPLETED | OUTPATIENT
Start: 2024-04-16 | End: 2024-04-16

## 2024-04-16 RX ADMIN — CARVEDILOL 12.5 MG: 6.25 TABLET, FILM COATED ORAL at 09:28

## 2024-04-16 RX ADMIN — ASPIRIN 81 MG: 81 TABLET, COATED ORAL at 09:25

## 2024-04-16 RX ADMIN — CEFUROXIME AXETIL 500 MG: 250 TABLET ORAL at 21:16

## 2024-04-16 RX ADMIN — POLYETHYLENE GLYCOL 3350 17 G: 17 POWDER, FOR SOLUTION ORAL at 17:43

## 2024-04-16 RX ADMIN — METHOCARBAMOL 500 MG: 500 TABLET ORAL at 21:17

## 2024-04-16 RX ADMIN — Medication 240 ML: at 14:27

## 2024-04-16 RX ADMIN — BISACODYL 5 MG: 5 TABLET, COATED ORAL at 10:23

## 2024-04-16 RX ADMIN — CEFUROXIME AXETIL 500 MG: 250 TABLET ORAL at 09:28

## 2024-04-16 RX ADMIN — BISACODYL 10 MG: 10 SUPPOSITORY RECTAL at 10:22

## 2024-04-16 RX ADMIN — HYDROCODONE BITARTRATE AND ACETAMINOPHEN 1 TABLET: 5; 325 TABLET ORAL at 13:42

## 2024-04-16 RX ADMIN — KETOROLAC TROMETHAMINE 15 MG: 15 INJECTION, SOLUTION INTRAMUSCULAR; INTRAVENOUS at 02:52

## 2024-04-16 RX ADMIN — SODIUM CHLORIDE, PRESERVATIVE FREE 10 ML: 5 INJECTION INTRAVENOUS at 09:40

## 2024-04-16 RX ADMIN — ATORVASTATIN CALCIUM 20 MG: 20 TABLET, FILM COATED ORAL at 21:17

## 2024-04-16 RX ADMIN — WARFARIN SODIUM 5 MG: 5 TABLET ORAL at 17:34

## 2024-04-16 RX ADMIN — CARVEDILOL 12.5 MG: 6.25 TABLET, FILM COATED ORAL at 21:17

## 2024-04-16 RX ADMIN — ENOXAPARIN SODIUM 30 MG: 100 INJECTION SUBCUTANEOUS at 09:29

## 2024-04-16 RX ADMIN — SODIUM CHLORIDE, PRESERVATIVE FREE 10 ML: 5 INJECTION INTRAVENOUS at 21:17

## 2024-04-16 RX ADMIN — PANTOPRAZOLE SODIUM 40 MG: 40 TABLET, DELAYED RELEASE ORAL at 10:21

## 2024-04-16 RX ADMIN — SENNOSIDES AND DOCUSATE SODIUM 2 TABLET: 8.6; 5 TABLET ORAL at 21:17

## 2024-04-16 RX ADMIN — KETOROLAC TROMETHAMINE 15 MG: 15 INJECTION, SOLUTION INTRAMUSCULAR; INTRAVENOUS at 13:22

## 2024-04-16 RX ADMIN — METHOCARBAMOL 500 MG: 500 TABLET ORAL at 09:27

## 2024-04-16 RX ADMIN — ANTACID TABLETS 500 MG: 500 TABLET, CHEWABLE ORAL at 09:25

## 2024-04-16 RX ADMIN — HYDROCODONE BITARTRATE AND ACETAMINOPHEN 1 TABLET: 5; 325 TABLET ORAL at 20:48

## 2024-04-16 RX ADMIN — SENNOSIDES AND DOCUSATE SODIUM 2 TABLET: 8.6; 5 TABLET ORAL at 09:38

## 2024-04-16 ASSESSMENT — PAIN SCALES - GENERAL
PAINLEVEL_OUTOF10: 8
PAINLEVEL_OUTOF10: 7
PAINLEVEL_OUTOF10: 4
PAINLEVEL_OUTOF10: 5
PAINLEVEL_OUTOF10: 7
PAINLEVEL_OUTOF10: 3
PAINLEVEL_OUTOF10: 5
PAINLEVEL_OUTOF10: 2
PAINLEVEL_OUTOF10: 2
PAINLEVEL_OUTOF10: 0

## 2024-04-16 ASSESSMENT — PAIN SCALES - WONG BAKER
WONGBAKER_NUMERICALRESPONSE: HURTS A LITTLE BIT
WONGBAKER_NUMERICALRESPONSE: HURTS A LITTLE BIT

## 2024-04-16 ASSESSMENT — PAIN DESCRIPTION - ORIENTATION
ORIENTATION: LOWER;RIGHT
ORIENTATION: LOWER;RIGHT

## 2024-04-16 ASSESSMENT — PAIN DESCRIPTION - LOCATION
LOCATION: ABDOMEN
LOCATION: ABDOMEN;BACK
LOCATION: ABDOMEN
LOCATION: ABDOMEN

## 2024-04-16 ASSESSMENT — PAIN DESCRIPTION - DESCRIPTORS
DESCRIPTORS: ACHING

## 2024-04-16 ASSESSMENT — PAIN - FUNCTIONAL ASSESSMENT
PAIN_FUNCTIONAL_ASSESSMENT: PREVENTS OR INTERFERES SOME ACTIVE ACTIVITIES AND ADLS
PAIN_FUNCTIONAL_ASSESSMENT: PREVENTS OR INTERFERES SOME ACTIVE ACTIVITIES AND ADLS

## 2024-04-16 NOTE — DISCHARGE INSTR - COC
Continuity of Care Form    Patient Name: Ivania Ruiz   :  3/3/1933  MRN:  [id number]    Admit date:  2024  Discharge date:  ***    Code Status Order: Full Code   Advance Directives:     Admitting Physician:  Nya De La Vega MD  PCP: Carolann Sheets MD    Discharging Nurse: ***  Discharging Hospital Unit/Room#: 3AN-3315/3315-01  Discharging Unit Phone Number: ***    Emergency Contact:   Extended Emergency Contact Information  Primary Emergency Contact: Felipa Mitchell  Mobile Phone: [phone]  Relation: Child    Past Surgical History:  No past surgical history on file.    Immunization History:     There is no immunization history on file for this patient.    Active Problems:  Patient Active Problem List   Diagnosis Code    Generalized weakness R53.1    Lower back pain M54.50       Isolation/Infection:   Isolation            No Isolation          Patient Infection Status       None to display            Nurse Assessment:  Last Vital Signs: /67   Pulse 67   Temp 98.2 °F (36.8 °C) (Oral)   Resp 16   Ht 1.52 m (4' 11.84\")   Wt 68.7 kg (151 lb 6.4 oz)   SpO2 95%   BMI 29.72 kg/m²     Last documented pain score (0-10 scale): Pain Level: 5  Last Weight:   Wt Readings from Last 1 Encounters:   04/15/24 68.7 kg (151 lb 6.4 oz)     Mental Status:  {IP PT MENTAL STATUS:64267}    IV Access:  { TING IV ACCESS:808798503}    Nursing Mobility/ADLs:  Walking   {CHP DME ADLs:751698932}  Transfer  {CHP DME ADLs:949105030}  Bathing  {CHP DME ADLs:690805075}  Dressing  {CHP DME ADLs:245721980}  Toileting  {CHP DME ADLs:829881457}  Feeding  {CHP DME ADLs:005296992}  Med Admin  {CHP DME ADLs:683018218}  Med Delivery   { TING MED Delivery:370471644}    Wound Care Documentation and Therapy:        Elimination:  Continence:   Bowel: {YES / NO:}  Bladder: {YES / NO:}  Urinary Catheter: {Urinary Catheter:761594099}   Colostomy/Ileostomy/Ileal Conduit: {YES / NO:}       Date of Last BM: ***  No

## 2024-04-16 NOTE — CARE COORDINATION
CM reached out to RN for update, awaiting BM. RN reports family will be here at 8 pm tonight if pt is ready for discharge.   RN reports she updated [facility] at [facility].     Electronically signed by Sofia Washington on 4/16/2024 at 4:21 PM

## 2024-04-16 NOTE — PLAN OF CARE
Problem: Discharge Planning  Goal: Discharge to home or other facility with appropriate resources  4/16/2024 1045 by Mamie Malone  Outcome: Progressing     Problem: Skin/Tissue Integrity  Goal: Absence of new skin breakdown  Description: 1.  Monitor for areas of redness and/or skin breakdown  2.  Assess vascular access sites hourly  3.  Every 4-6 hours minimum:  Change oxygen saturation probe site  4.  Every 4-6 hours:  If on nasal continuous positive airway pressure, respiratory therapy assess nares and determine need for appliance change or resting period.  4/16/2024 1045 by Mamie Malone  Outcome: Progressing     Problem: ABCDS Injury Assessment  Goal: Absence of physical injury  4/16/2024 1045 by Mamie Malone  Outcome: Progressing     Problem: Safety - Adult  Goal: Free from fall injury  4/16/2024 1045 by Mamie Malone  Outcome: Progressing     Problem: Pain  Goal: Verbalizes/displays adequate comfort level or baseline comfort level  4/16/2024 1045 by Mamie Malone  Outcome: Progressing

## 2024-04-17 VITALS
HEART RATE: 75 BPM | OXYGEN SATURATION: 95 % | TEMPERATURE: 97.8 F | BODY MASS INDEX: 28.91 KG/M2 | DIASTOLIC BLOOD PRESSURE: 77 MMHG | RESPIRATION RATE: 16 BRPM | WEIGHT: 147.27 LBS | SYSTOLIC BLOOD PRESSURE: 155 MMHG | HEIGHT: 60 IN

## 2024-04-17 LAB
ANION GAP SERPL CALCULATED.3IONS-SCNC: 10 MMOL/L (ref 3–16)
BASOPHILS # BLD: 0.1 K/UL (ref 0–0.2)
BASOPHILS NFR BLD: 0.6 %
BUN SERPL-MCNC: 11 MG/DL (ref 7–20)
CALCIUM SERPL-MCNC: 8.7 MG/DL (ref 8.3–10.6)
CHLORIDE SERPL-SCNC: 104 MMOL/L (ref 99–110)
CO2 SERPL-SCNC: 24 MMOL/L (ref 21–32)
CREAT SERPL-MCNC: 0.7 MG/DL (ref 0.6–1.2)
DEPRECATED RDW RBC AUTO: 15.3 % (ref 12.4–15.4)
EOSINOPHIL # BLD: 0.6 K/UL (ref 0–0.6)
EOSINOPHIL NFR BLD: 6.6 %
GFR SERPLBLD CREATININE-BSD FMLA CKD-EPI: 82 ML/MIN/{1.73_M2}
GLUCOSE SERPL-MCNC: 93 MG/DL (ref 70–99)
HCT VFR BLD AUTO: 36.6 % (ref 36–48)
HGB BLD-MCNC: 11.7 G/DL (ref 12–16)
INR PPP: 1.71 (ref 0.85–1.15)
LYMPHOCYTES # BLD: 1.8 K/UL (ref 1–5.1)
LYMPHOCYTES NFR BLD: 21.1 %
MCH RBC QN AUTO: 28.2 PG (ref 26–34)
MCHC RBC AUTO-ENTMCNC: 32.1 G/DL (ref 31–36)
MCV RBC AUTO: 87.8 FL (ref 80–100)
MONOCYTES # BLD: 0.9 K/UL (ref 0–1.3)
MONOCYTES NFR BLD: 10.7 %
NEUTROPHILS # BLD: 5.2 K/UL (ref 1.7–7.7)
NEUTROPHILS NFR BLD: 61 %
PLATELET # BLD AUTO: 247 K/UL (ref 135–450)
PMV BLD AUTO: 8.2 FL (ref 5–10.5)
POTASSIUM SERPL-SCNC: 4.6 MMOL/L (ref 3.5–5.1)
PROTHROMBIN TIME: 20.2 SEC (ref 11.9–14.9)
RBC # BLD AUTO: 4.17 M/UL (ref 4–5.2)
SODIUM SERPL-SCNC: 138 MMOL/L (ref 136–145)
WBC # BLD AUTO: 8.6 K/UL (ref 4–11)

## 2024-04-17 PROCEDURE — G0378 HOSPITAL OBSERVATION PER HR: HCPCS

## 2024-04-17 PROCEDURE — 6370000000 HC RX 637 (ALT 250 FOR IP): Performed by: NURSE PRACTITIONER

## 2024-04-17 PROCEDURE — 36415 COLL VENOUS BLD VENIPUNCTURE: CPT

## 2024-04-17 PROCEDURE — 85025 COMPLETE CBC W/AUTO DIFF WBC: CPT

## 2024-04-17 PROCEDURE — 85610 PROTHROMBIN TIME: CPT

## 2024-04-17 PROCEDURE — 80048 BASIC METABOLIC PNL TOTAL CA: CPT

## 2024-04-17 PROCEDURE — 97530 THERAPEUTIC ACTIVITIES: CPT

## 2024-04-17 PROCEDURE — 6370000000 HC RX 637 (ALT 250 FOR IP): Performed by: STUDENT IN AN ORGANIZED HEALTH CARE EDUCATION/TRAINING PROGRAM

## 2024-04-17 PROCEDURE — 97535 SELF CARE MNGMENT TRAINING: CPT

## 2024-04-17 RX ORDER — AMLODIPINE BESYLATE 5 MG/1
5 TABLET ORAL DAILY
Status: DISCONTINUED | OUTPATIENT
Start: 2024-04-17 | End: 2024-04-17 | Stop reason: HOSPADM

## 2024-04-17 RX ORDER — WARFARIN SODIUM 5 MG/1
5 TABLET ORAL
Status: DISCONTINUED | OUTPATIENT
Start: 2024-04-19 | End: 2024-04-17 | Stop reason: HOSPADM

## 2024-04-17 RX ORDER — WARFARIN SODIUM 2.5 MG/1
2.5 TABLET ORAL
Status: DISCONTINUED | OUTPATIENT
Start: 2024-04-17 | End: 2024-04-17 | Stop reason: HOSPADM

## 2024-04-17 RX ADMIN — PANTOPRAZOLE SODIUM 40 MG: 40 TABLET, DELAYED RELEASE ORAL at 08:55

## 2024-04-17 RX ADMIN — POLYETHYLENE GLYCOL-3350 AND ELECTROLYTES 2000 ML: 236; 6.74; 5.86; 2.97; 22.74 POWDER, FOR SOLUTION ORAL at 08:54

## 2024-04-17 RX ADMIN — HYDROCODONE BITARTRATE AND ACETAMINOPHEN 1 TABLET: 5; 325 TABLET ORAL at 14:52

## 2024-04-17 RX ADMIN — METHOCARBAMOL 500 MG: 500 TABLET ORAL at 14:51

## 2024-04-17 RX ADMIN — HYDROCODONE BITARTRATE AND ACETAMINOPHEN 1 TABLET: 5; 325 TABLET ORAL at 11:06

## 2024-04-17 RX ADMIN — CEFUROXIME AXETIL 500 MG: 250 TABLET ORAL at 08:54

## 2024-04-17 RX ADMIN — SENNOSIDES AND DOCUSATE SODIUM 2 TABLET: 8.6; 5 TABLET ORAL at 08:54

## 2024-04-17 RX ADMIN — NALOXEGOL OXALATE 25 MG: 25 TABLET, FILM COATED ORAL at 11:09

## 2024-04-17 RX ADMIN — HYDROCODONE BITARTRATE AND ACETAMINOPHEN 1 TABLET: 5; 325 TABLET ORAL at 04:12

## 2024-04-17 RX ADMIN — CARVEDILOL 12.5 MG: 6.25 TABLET, FILM COATED ORAL at 08:54

## 2024-04-17 RX ADMIN — METHOCARBAMOL 500 MG: 500 TABLET ORAL at 08:55

## 2024-04-17 RX ADMIN — AMLODIPINE BESYLATE 5 MG: 5 TABLET ORAL at 11:09

## 2024-04-17 RX ADMIN — ASPIRIN 81 MG: 81 TABLET, COATED ORAL at 08:56

## 2024-04-17 ASSESSMENT — PAIN DESCRIPTION - ORIENTATION: ORIENTATION: ANTERIOR;LOWER

## 2024-04-17 ASSESSMENT — PAIN SCALES - WONG BAKER: WONGBAKER_NUMERICALRESPONSE: NO HURT

## 2024-04-17 ASSESSMENT — PAIN DESCRIPTION - DESCRIPTORS: DESCRIPTORS: ACHING

## 2024-04-17 ASSESSMENT — PAIN DESCRIPTION - LOCATION
LOCATION: ABDOMEN
LOCATION: BACK

## 2024-04-17 ASSESSMENT — PAIN SCALES - GENERAL
PAINLEVEL_OUTOF10: 8
PAINLEVEL_OUTOF10: 1
PAINLEVEL_OUTOF10: 9
PAINLEVEL_OUTOF10: 7

## 2024-04-17 ASSESSMENT — PAIN - FUNCTIONAL ASSESSMENT: PAIN_FUNCTIONAL_ASSESSMENT: PREVENTS OR INTERFERES SOME ACTIVE ACTIVITIES AND ADLS

## 2024-04-17 NOTE — PROGRESS NOTES
[facility]    4/15/2024 9:54 AM        Name: Ivania Ruiz .              Admitted: 4/14/2024  Primary Care Provider: Carolann Sheets MD (Tel: [phone])      Chief complaint: 89 yo female presented with low back and low abdominal pain. This has been ongoing issue since March, she has been hospitalized x 2 at [facility] for same. She does have degenerative changes in spine with compression fractures. Admitted with lower back pain.     Subjective:  Resting in bed. States pain tolerable while at rest but worsens with movement. Denies saddle anesthesia, has urinary incontinence at baseline, no bowel incontinence, sensation intact in feet. Denies nausea, last BM was several days ago.     Reviewed interval ancillary notes    Current Medications  lidocaine 4 % external patch 2 patch, Once  aspirin EC tablet 81 mg, Daily  atorvastatin (LIPITOR) tablet 20 mg, Nightly  carvedilol (COREG) tablet 12.5 mg, BID  HYDROcodone-acetaminophen (NORCO) 5-325 MG per tablet 1 tablet, Q6H PRN  pantoprazole (PROTONIX) tablet 40 mg, Daily  sodium chloride flush 0.9 % injection 5-40 mL, 2 times per day  sodium chloride flush 0.9 % injection 5-40 mL, PRN  0.9 % sodium chloride infusion, PRN  potassium chloride (KLOR-CON M) extended release tablet 40 mEq, PRN   Or  potassium bicarb-citric acid (EFFER-K) effervescent tablet 40 mEq, PRN   Or  potassium chloride 10 mEq/100 mL IVPB (Peripheral Line), PRN  magnesium sulfate 2000 mg in 50 mL IVPB premix, PRN  ondansetron (ZOFRAN-ODT) disintegrating tablet 4 mg, Q8H PRN   Or  ondansetron (ZOFRAN) injection 4 mg, Q6H PRN  polyethylene glycol (GLYCOLAX) packet 17 g, Daily PRN  acetaminophen (TYLENOL) tablet 650 mg, Q6H PRN   Or  acetaminophen (TYLENOL) suppository 650 mg, Q6H PRN  morphine (PF) injection 2 mg, Q4H PRN  warfarin (COUMADIN) tablet 5 mg, Daily        Objective:  BP (!) 154/91  
                                                                      [facility]    4/16/2024 10:27 AM        Name: Ivania Ruiz .              Admitted: 4/14/2024  Primary Care Provider: Carolann Sheets MD (Tel: [phone])      Chief complaint: 89 yo female presented with low back and low abdominal pain. This has been ongoing issue since March, she has been hospitalized x 2 at [facility] for same. She does have degenerative changes in spine with compression fractures. Admitted with lower back pain.     Subjective:  Resting in bed. States pain improved and adequately controlled on current regimen, has not required IV morphine. She does offer c/o constipation, reports last BM was 10 days ago. Denies nausea, abdominal pain. Wants to go home today.    Reviewed interval ancillary notes    Current Medications  aspirin EC tablet 81 mg, Daily  atorvastatin (LIPITOR) tablet 20 mg, Nightly  carvedilol (COREG) tablet 12.5 mg, BID  HYDROcodone-acetaminophen (NORCO) 5-325 MG per tablet 1 tablet, Q6H PRN  pantoprazole (PROTONIX) tablet 40 mg, Daily  sodium chloride flush 0.9 % injection 5-40 mL, 2 times per day  sodium chloride flush 0.9 % injection 5-40 mL, PRN  0.9 % sodium chloride infusion, PRN  potassium chloride (KLOR-CON M) extended release tablet 40 mEq, PRN   Or  potassium bicarb-citric acid (EFFER-K) effervescent tablet 40 mEq, PRN   Or  potassium chloride 10 mEq/100 mL IVPB (Peripheral Line), PRN  magnesium sulfate 2000 mg in 50 mL IVPB premix, PRN  ondansetron (ZOFRAN-ODT) disintegrating tablet 4 mg, Q8H PRN   Or  ondansetron (ZOFRAN) injection 4 mg, Q6H PRN  polyethylene glycol (GLYCOLAX) packet 17 g, Daily PRN  acetaminophen (TYLENOL) tablet 650 mg, Q6H PRN   Or  acetaminophen (TYLENOL) suppository 650 mg, Q6H PRN  morphine (PF) injection 2 mg, Q4H PRN  warfarin (COUMADIN) tablet 5 mg, Daily  methocarbamol (ROBAXIN) tablet 500 mg, TID  cefUROXime (CEFTIN) tablet 500 mg, 
    Pharmacy to Dose Warfarin    Pharmacy consulted to dose warfarin for Afib.    INR Goal: 2-3    INR today: 1.59    Home dose: 2.5 mg daily except 5 mg Tu/Fri  Of note, patient follows with her cardiologist as outpatient. Recent subtherapeutic INRs and has not been bridged.    Assessment/Plan:  - INR subtherapeutic  - warfarin 5 mg tonight  - Possible concomitant drug-drug interactions include: aspirin     Pharmacy will continue to follow.    Elenita Tristan, [facility]  PGY-1 Resident  [id number]           
    Pharmacy to Dose Warfarin    Pharmacy consulted to dose warfarin for Afib.    INR Goal: 2-3    INR today: 1.71    Home dose: 2.5 mg daily except 5 mg Tu/Fri  Of note, patient follows with her cardiologist as outpatient. Recent subtherapeutic INRs and has not been bridged.    Assessment/Plan:  - INR subtherapeutic  - resume home regimen, warfarin 2.5 mg tonight  - Possible concomitant drug-drug interactions include: aspirin     Pharmacy will continue to follow.    Daylin RamirezD, [facility]  [id number]  4/17/2024 11:46 AM             
  [facility] - Inpatient Rehabilitation Department   Phone: [phone]    Occupational Therapy    [] Initial Evaluation            [x] Daily Treatment Note         [] Discharge Summary      Patient: Ivania Ruiz   : 3/3/1933   MRN: [id number]   Date of Service:  2024    Admitting Diagnosis:  Generalized weakness  Current Admission Summary:  89 y.o. female with a pmh of A-fib, COPD, CVA, hypertension, hyperlipidemia who presents with chief complaint of lower back pain     Past Medical History:  has no past medical history on file.  Past Surgical History:  has no past surgical history on file.    Discharge Recommendations: Ivania Ruiz scored a 18/24 on the AM-PAC ADL Inpatient form. Current research shows that an AM-PAC score of 17 or less is typically not associated with a discharge to the patient's home setting. Based on the patient's AM-PAC score and their current ADL deficits, it is recommended that the patient have 3-5 sessions per week of Occupational Therapy at d/c to increase the patient's independence.  Please see assessment section for further patient specific details.    If patient discharges prior to next session this note will serve as a discharge summary.  Please see below for the latest assessment towards goals.      If pt declines, recommend HHOT and  supervision/assist    DME Required For Discharge: patient has all required DME for discharge    Precautions/Restrictions: high fall risk  Weight Bearing Restrictions: no restrictions  [] Right Upper Extremity  [] Left Upper Extremity [] Right Lower Extremity  [] Left Lower Extremity     Required Braces/Orthotics: no braces required   [] Right  [] Left  Positional Restrictions:no positional restrictions    Pre-Admission Information   Lives With: daughter, pt lives in [facility] suite     Type of Home: house  Home Layout: one level  Home Access:  8-9 platform step to enter with handrail.  Handrails are located on R side.  Bathroom 
CLINICAL PHARMACY NOTE: MEDS TO BEDS    Total # of Prescriptions Filled: 1   The following medications were delivered to the patient:  Miralax powder    Additional Documentation:     LORRIE Khan approved medication delivery    Medication was delivered to patient's room (patient unable to sign)    Sallie German CPhT   
Called pt's daughter to advise, pt still not had BM today and pt does nto feel comfortable going home with not have BM.  Dtr also states pt now is mostly incontinent of urine and can only urinate a small amount at a time.   And that is a problem for being discharged to home.  Per daughter pt lives in a apartment that is part of daughter's home but has separate entrance to pt's apartment.  Pt concerned about going home and pt's daughter concerned about pt going home.  Da BRIGGS to advise.    
Enema given, pt resting on left side and will call if feels need to have BM.   
Palliative Care:        F/U visit with patient. She remains constipated. Nurse Khan providing Golyte drink at time of our visit to assist in this process.     Patient in agreement after BM that she is returning home. Call to daughter Felipa to confirm her ability to care for mother in her home setting. Much emotional support provided. Felipa is in agreement with discharge plan to home after BM and for [facility] team to meet them at home for initial intake and reinforce the support they will be able to provide for patient and daughter.     F/U call to [facility]. In agreement with discussion noted above and will also continue to layer educational support on their services.     FREIDA Ji and Nurse Khan updated of these discussions. DC pending to home today. [facility] liaison [facility] updated.     Palliative team will continue to follow as needed. Contact information provided on patient communication board. FREIDA and Nurse updated of these discussions.     Electronically signed by Gladys Clancy RN, BSN, CHPN (Palliative Care) [phone]    
Palliative Care:        [facility] met with patient and daughter Felipa via phone. Pt/family agree when medically stable for DC to home they are in agreement to sign up with hospice services.     Per choice they have elected [facility]. Liaison [facility].   P: [phone]  F: [phone]     Anita Crowell NP and FREIDA Ji updated of these conversations. Will continue to follow as needed.     Electronically signed by Gladys Clancy RN, BSN, CHPN (Palliative Care) [phone]    
Pt A&OX4, VSS, pt admitted to room 3315, complaining of lower abdominal pain and discomfort, head to toe assessment done, call light within reach, RN monitoring during shift.   
Pt Aox4 VSS pt stated pain in abdomen PRN giving. Pt had no BM overnight. Pt Had no IV asssce at the start of the shift. Pt request to keep out  Informed Suzanne huynh NP gave a order okay to leave IV out.   
Pt on commode, only able to have very small hard BM.  Will msg MD that pt would like to try an enema.   
Shift assessment completed. Routine vitals stable. Scheduled medications given. Patient is awake, alert and orientedx4. Pt afraid to work with therapy, informed her they will only push her as far as she can go and to do her best, she agreed to do so. Occupational therapy in room. Call light in reach, bed alarm is on. No further needs at this time.   
4  Command Following:   WFL    Education  Barriers To Learning: emotional reports getting anxious about the pain/spasms  Patient Education: patient educated on goals, PT role and benefits, plan of care, general safety, functional mobility training, proper use of assistive device/equipment, transfer training, discharge recommendations  Learning Assessment:  patient verbalizes and demonstrates understanding    Assessment  Activity Tolerance: Fair; very limited by back pain.  Impairments Requiring Therapeutic Intervention: decreased functional mobility, decreased strength, decreased safety awareness, decreased endurance, decreased balance, increased pain, decreased posture  Prognosis: good  Clinical Assessment: Pt. Presents with chronic back pain d/t multiple chronic compression fractures of the Lumbar spine.  Pt. Has had 3 recent admissions for this problem.  Patient is presenting well below her baseline. She has elected to discharge home with hospice care. Will continue to see patient during her hospital admission; however, no further PT required upon discharge.   Safety Interventions: patient left in bed, bed alarm in place, call light within reach, gait belt, patient at risk for falls, and nurse notified    Plan  Frequency: 3-5 x/per week  Current Treatment Recommendations: balance training, functional mobility training, transfer training, gait training, stair training, endurance training, neuromuscular re-education, pain management, home exercise program, and safety education    Goals  Patient Goals: Return home and decrease pain   Short Term Goals:  Time Frame: by d/c    Patient will complete bed mobility at modified independent   Patient will complete transfers at modified independent   Patient will ambulate 150 ft with use of rolling walker at modified independent  Patient will ascend/descend 1 curb stairs without use of HR at supervision    Above goals reviewed on 4/17/2024.  All goals are ongoing at this time 
ambulation at this time. Recommend continued OT services to facilitate return to PLOF  Safety Interventions: patient left in bed, bed alarm in place, call light within reach, gait belt, and nurse notified    Plan  Frequency: 3-5 x/per week  Current Treatment Recommendations: balance training, functional mobility training, transfer training, endurance training, patient/caregiver education, and ADL/self-care training    Goals  Patient Goals: tor return home   Short Term Goals:  Time Frame: discharge  Patient will complete lower body ADL at supervision   Patient will complete toileting at supervision   Patient will complete functional transfers at supervision   Patient will complete functional mobility at supervision     Above goals reviewed on 4/15/2024.  All goals are ongoing at this time unless indicated above.       Therapy Session Time     Individual Group Co-treatment   Time In  832     Time Out  910     Minutes  38          Timed Code Treatment Minutes:   23  Total Treatment Minutes:  38       Electronically Signed By: Vera Andrea OTR/OZ [id number]         
mg  - Resume amlodipine 5 mg as taking at home  - Continue to follow    Constipation  - Patient reports last BP 11 days ago  - Likely opioid induced constipation  - CT abd/pelvis on admission with scattered stool throughout the colon, no ileus or obstruction  - KUB 4/16 with moderate to large stool burden  - No BM despite Glycolax, dulcolax (po and suppository), Senokot  - Soft stool noted on rectal exam, no impaction  - Ordered 1 bottle mag citrate (not available), ordered Golytely  - Continue BID Senokot, daily Glycolax  - Add Movantik    Anticipate home with hospice possibly later today if bowels move.       Diet: ADULT DIET; Regular  Code:Full Code  DVT PPX: warfarin (INR 1.71)      CULLEN Ellis - CNP   4/17/2024 8:29 AM      
mobility this session  Impairments Requiring Therapeutic Intervention: decreased functional mobility, decreased strength, decreased safety awareness, decreased endurance, decreased balance, increased pain, decreased posture  Prognosis: good  Clinical Assessment: Pt. Presents with chronic back pain d/t multiple chronic compression fractures of the Lumbar spine.  Pt. Has had 3 recent admissions for this problem.  Pt. Appears to be below baseline function at this time.  Will benefit from PT while hospitalized.  Pt. Declines further HHPT.  Pt. Has 24 hour Supervision available at d/c.    Safety Interventions: patient left in bed, bed alarm in place, call light within reach, gait belt, and patient at risk for falls    Plan  Frequency: 3-5 x/per week  Current Treatment Recommendations: balance training, functional mobility training, transfer training, gait training, stair training, endurance training, neuromuscular re-education, pain management, home exercise program, and safety education    Goals  Patient Goals: Return home and decrease pain   Short Term Goals:  Time Frame: by d/c    Patient will complete bed mobility at modified independent   Patient will complete transfers at [facility]   Patient will ambulate 150 ft with use of rolling walker at modified independent  Patient will ascend/descend 1 curb stairs without use of HR at supervision    Above goals reviewed on 4/15/2024.  All goals are ongoing at this time unless indicated above.      Therapy Session Time      Individual Group Co-treatment   Time In 1220       Time Out 1300       Minutes 40         Timed Code Treatment Minutes:  25 Minutes  Total Treatment Minutes:  40       Electronically Signed By: NIK NICOLAS, PT, [id number]

## 2024-04-17 NOTE — CARE COORDINATION
Case Management -  Discharge Note      Patient Name: Ivania Ruiz                   YOB: 1933            Readmission Risk (Low < 19, Mod (19-27), High > 27): No data recorded  Current PCP: Carolann Sheets MD    ([facility]) Important Message from Medicare:    Date: OBS staus     PT AM-PAC: 18 /24  OT AM-PAC: 17 /24         [facility]   [phone]     Financial    Payor: [facility] MEDICARE / Plan: [facility] MEDICARE ADVANTAGE / Product Type: *No Product type* /     Pharmacy:  Potential assistance Purchasing Medications: No  Meds-to-Beds request: Yes      [facility] #[facility] [place], [place] - [address] [place] [address] [phone] - [facility] [phone]  [address] [place] [id number]  Phone: [phone] Fax: [phone]      Notes:    Additional Case Management Notes: CM faxed copy of TING and Hospice order to [facility] at [facility] fax # [phone]

## 2024-04-17 NOTE — DISCHARGE SUMMARY
COUMADIN  What changed:   medication strength  how much to take  how to take this  when to take this  additional instructions            CONTINUE taking these medications      amLODIPine 5 MG tablet  Commonly known as: NORVASC     aspirin 81 MG EC tablet     atorvastatin 20 MG tablet  Commonly known as: LIPITOR     carvedilol 12.5 MG tablet  Commonly known as: COREG     famotidine 20 MG tablet  Commonly known as: PEPCID     methocarbamol 500 MG tablet  Commonly known as: ROBAXIN     nitroGLYCERIN 0.4 MG SL tablet  Commonly known as: NITROSTAT     pantoprazole 40 MG tablet  Commonly known as: PROTONIX     senna-docusate 8.6-50 MG per tablet  Commonly known as: PERICOLACE     sucralfate 1 GM tablet  Commonly known as: CARAFATE            STOP taking these medications      doxycycline hyclate 100 MG capsule  Commonly known as: VIBRAMYCIN     furosemide 20 MG tablet  Commonly known as: LASIX     losartan 100 MG tablet  Commonly known as: COZAAR     oxyBUTYnin 10 MG extended release tablet  Commonly known as: DITROPAN-XL     tiZANidine 2 MG tablet  Commonly known as: ZANAFLEX               Where to Get Your Medications        These medications were sent to [facility] [address] - P [phone] - F [phone]  [address] [place] [address]      Phone: [phone]   polyethylene glycol 17 g packet       You can get these medications from any pharmacy    Bring a paper prescription for each of these medications  HYDROcodone-acetaminophen 5-325 MG per tablet         Discharge recommendations given to patient.  Follow Up. PCP in 1 week   Disposition.  Home with hospice  Activity. activity as tolerated  Diet: ADULT DIET; Regular      Spent 40 minutes in discharge process.    Signed:  CULLEN Ellis CNP     4/17/2024 2:31 PM